# Patient Record
Sex: FEMALE
[De-identification: names, ages, dates, MRNs, and addresses within clinical notes are randomized per-mention and may not be internally consistent; named-entity substitution may affect disease eponyms.]

---

## 2022-03-10 PROBLEM — Z00.00 ENCOUNTER FOR PREVENTIVE HEALTH EXAMINATION: Status: ACTIVE | Noted: 2022-03-10

## 2022-03-14 ENCOUNTER — APPOINTMENT (OUTPATIENT)
Dept: OTOLARYNGOLOGY | Facility: CLINIC | Age: 67
End: 2022-03-14
Payer: MEDICARE

## 2022-03-14 VITALS
HEIGHT: 60 IN | BODY MASS INDEX: 30.43 KG/M2 | DIASTOLIC BLOOD PRESSURE: 78 MMHG | WEIGHT: 155 LBS | SYSTOLIC BLOOD PRESSURE: 143 MMHG | HEART RATE: 59 BPM

## 2022-03-14 DIAGNOSIS — Z78.9 OTHER SPECIFIED HEALTH STATUS: ICD-10-CM

## 2022-03-14 DIAGNOSIS — H60.8X3 OTHER OTITIS EXTERNA, BILATERAL: ICD-10-CM

## 2022-03-14 DIAGNOSIS — H90.5 UNSPECIFIED SENSORINEURAL HEARING LOSS: ICD-10-CM

## 2022-03-14 DIAGNOSIS — J31.0 CHRONIC RHINITIS: ICD-10-CM

## 2022-03-14 DIAGNOSIS — H91.90 UNSPECIFIED HEARING LOSS, UNSPECIFIED EAR: ICD-10-CM

## 2022-03-14 DIAGNOSIS — J34.2 DEVIATED NASAL SEPTUM: ICD-10-CM

## 2022-03-14 PROCEDURE — 92567 TYMPANOMETRY: CPT

## 2022-03-14 PROCEDURE — 99203 OFFICE O/P NEW LOW 30 MIN: CPT

## 2022-03-14 PROCEDURE — 92557 COMPREHENSIVE HEARING TEST: CPT

## 2022-03-14 RX ORDER — LORAZEPAM 0.5 MG/1
0.5 TABLET ORAL
Qty: 360 | Refills: 0 | Status: ACTIVE | COMMUNITY
Start: 2021-11-01

## 2022-03-14 RX ORDER — DICLOFENAC SODIUM 1% 10 MG/G
1 GEL TOPICAL
Qty: 100 | Refills: 0 | Status: ACTIVE | COMMUNITY
Start: 2022-03-09

## 2022-03-14 RX ORDER — LITHIUM CARBONATE 300 MG/1
300 TABLET ORAL
Qty: 270 | Refills: 0 | Status: ACTIVE | COMMUNITY
Start: 2021-12-27

## 2022-03-14 RX ORDER — PERPHENAZINE 16 MG/1
16 TABLET ORAL
Qty: 180 | Refills: 0 | Status: ACTIVE | COMMUNITY
Start: 2021-12-27

## 2022-03-14 RX ORDER — CLOTRIMAZOLE AND BETAMETHASONE DIPROPIONATE 10; .5 MG/G; MG/G
1-0.05 CREAM TOPICAL
Qty: 45 | Refills: 0 | Status: ACTIVE | COMMUNITY
Start: 2022-01-11

## 2022-03-14 RX ORDER — LEVOTHYROXINE SODIUM 0.1 MG/1
100 TABLET ORAL
Qty: 90 | Refills: 0 | Status: ACTIVE | COMMUNITY
Start: 2022-01-18

## 2022-03-14 RX ORDER — METOPROLOL TARTRATE 25 MG/1
25 TABLET, FILM COATED ORAL
Qty: 90 | Refills: 0 | Status: ACTIVE | COMMUNITY
Start: 2022-01-18

## 2022-03-14 RX ORDER — ESTRADIOL 10 UG/1
10 TABLET VAGINAL
Qty: 24 | Refills: 0 | Status: ACTIVE | COMMUNITY
Start: 2021-07-28

## 2022-03-14 NOTE — PHYSICAL EXAM
[Hearing Carroll Test (Tuning Fork On Forehead)] : no lateralization of tone [] : septum deviated to the right [Midline] : trachea located in midline position [Normal] : no masses and lesions seen, face is symmetric [Hearing Loss Right Only] : normal [Hearing Loss Left Only] : normal [FreeTextEntry7] : axomer on the auricle  [FreeTextEntry8] : cerumen removed via curettage  [FreeTextEntry9] : cerumen removed via curettage  [FreeTextEntry2] : sinuses nontender to percussion

## 2022-03-14 NOTE — ASSESSMENT
[FreeTextEntry1] : Reviewed and reconciled medications, allergies, PMHx, PSHx, SocHx, FMHx. \par \par Audio:\par -TYMPS:TYPE A AU (ETF ABNORMAL AU)\par -AD: ESSENTIALLY MILD TO MODERATE SNHL 250-8000 HZ \par -AS: HEARING WNL TO A MILD SNHL 250-8000 HZ\par right 80% discrim 75db TPP -18dapa \par left 92% discrim 65db TPP -16dapa\par mild loss of hearing in the left ear \par ABR required\par \par Plan:\par cerumen removed. Audio - results interpreted by Dr. Mccoy and reviewed with the patient. ABR ordered - if abnormal MRI required. FU after ABR

## 2022-03-14 NOTE — ADDENDUM
[FreeTextEntry1] : Documented by Justo Horner acting as scribe for Dr. Mccoy on 03/14/2022. \par \par All Medical record entries made by the scribe were at my. Dr. Mccoy direction and personally dictated by me on 03/14/2022. I have reviewed the chart and agree that the record accurately reflects my personal performance of the history, physical exam, assessment and plan. I have also personally directed, reviewed, and agreed with the discharge instructions.

## 2022-03-14 NOTE — DATA REVIEWED
[de-identified] : \par -TYMPS:TYPE A AU (ETF ABNORMAL AU)\par -AD: ESSENTIALLY MILD TO MODERATE SNHL 250-8000 HZ \par -AS: HEARING WNL TO A MILD SNHL 250-8000 HZ\par RECS: 1) ENT F/U 2)RE-EVAL/ FURTHER TESTING AS PER MD 3)FURTHER RECS PENDING (I.E. HAE)

## 2022-03-14 NOTE — HISTORY OF PRESENT ILLNESS
[de-identified] : The patient presents today for hearing loss. Pt reports she cant hear TV  or phone. Pt states going to Dr. Carroll her PCP and was told she has wax in the her ears and she should see an ENT. Pt denies tinnitus, family history of hearing loss, or exposure to any loud noise.

## 2022-03-14 NOTE — CONSULT LETTER
[Dear  ___] : Dear  [unfilled], [Consult Letter:] : I had the pleasure of evaluating your patient, [unfilled]. [Please see my note below.] : Please see my note below. [Consult Closing:] : Thank you very much for allowing me to participate in the care of this patient.  If you have any questions, please do not hesitate to contact me. [Sincerely,] : Sincerely, [FreeTextEntry3] : Td Mccoy MD FACS

## 2022-03-30 ENCOUNTER — APPOINTMENT (OUTPATIENT)
Dept: OTOLARYNGOLOGY | Facility: CLINIC | Age: 67
End: 2022-03-30